# Patient Record
(demographics unavailable — no encounter records)

---

## 2025-04-14 NOTE — REVIEW OF SYSTEMS
[FreeTextEntry4] : R sided hearing loss [de-identified] : segment of hyperpigmentation on abdomen  [Hearing Loss] : no hearing loss [Joint Pain] : joint pain [Negative] : Integumentary

## 2025-04-14 NOTE — PLAN
[FreeTextEntry1] : Podiatry right heel pain- referred to podiatry to further assess for osteophytes, heel spur-referred to Dr. Montana Rodriguez Endocrinology vitamin D deficiency continue with vitamin D3 5000 IU qd, check vitamin D level Cardiology hyperlipidemia-check FLP, low chol diet advised Psychiatry patient asked for therapist contact info-referred to Dr. Lauren Russo  Patient is not fasting today  checking labs - CBC, CMP, TSH, Lipid panel, TSH and A1C (RX given and recommended to visit [facility] labs) check EKG (results as above)

## 2025-04-14 NOTE — END OF VISIT
[FreeTextEntry3] : I, Cristy Montoya, acted solely as scribe for Dr. Celestino Gonzalez DO on this date 04/14/2025.   All medical record entries made by the Scribe were at my, Dr. Celestino Gonzalez DO direction and personally dictated by me on 04/14/2025, I have reviewed the chart and agree that the record accurately reflects my personal performance of the history, physical exam, assessment and plan. I have also personally directed, reviewed and agreed with the chart.     [Time Spent: ___ minutes] : I have spent [unfilled] minutes of time on the encounter which excludes teaching and separately reported services.

## 2025-04-14 NOTE — HEALTH RISK ASSESSMENT
[Good] : ~his/her~  mood as  good [2 - 4 times a month (2 pts)] : 2-4 times a month (2 points) [5 or 6 (2 pts)] : 5 or 6 (2  points) [Monthly (2 pts)] : Monthly (2 points) [No] : In the past 12 months have you used drugs other than those required for medical reasons? No [Little interest or pleasure doing things] : 1) Little interest or pleasure doing things [Feeling down, depressed, or hopeless] : 2) Feeling down, depressed, or hopeless [0] : 2) Feeling down, depressed, or hopeless: Not at all (0) [PHQ-2 Negative - No further assessment needed] : PHQ-2 Negative - No further assessment needed [Audit-CScore] : 6 [[id number]] : 0

## 2025-04-14 NOTE — HISTORY OF PRESENT ILLNESS
[FreeTextEntry1] : annual wellness visit  [de-identified] : Patient is a 32 y/o m with PMHx as below presents today for annual wellness visit. The patient is not currently taking any prescription medications, but he does take a multivitamin and Vit D3 5000 IU daily. He has a hx of low Vit D.  Patient complains of intermittent pain of the right heel x 5 months.  He describes the pain as sharp and present upon getting out of bed in the morning. He says it has exacerbated in the winter months, after playing basketball.  He does not take any medications to improve it.   Vaccinations The patient received 3 doses of the COVID vaccine.  He did not receive the 4001-0007 Flu vaccine. He received the TDAP 03/2024.  The patient goes to the gym about 4 days a week and lifts weights. He reports he tries to eat a balanced, healthy diet but does not follow a specific plan.  The patient has no active cardiac issues. He was dx w/ WPW about 15 years ago and is s/p ablation x2 at [facility]. He denies having any issues since.   Last blood report in April of 2024 reveals elevated LDL 149mg/dL, elevated cholesterol 222mg/dL  Patient is not fasting today.

## 2025-04-14 NOTE — ASSESSMENT
[Vaccines Reviewed] : Immunizations reviewed today. Please see immunization details in the vaccine log within the immunization flowsheet.  [FreeTextEntry1] : Patient is a 32 y/o m with a PMHx as stated above, presenting today for annual wellness visit.

## 2025-04-14 NOTE — REVIEW OF SYSTEMS
[FreeTextEntry4] : R sided hearing loss [de-identified] : segment of hyperpigmentation on abdomen  [Hearing Loss] : no hearing loss [Joint Pain] : joint pain [Negative] : Integumentary

## 2025-04-14 NOTE — PLAN
[FreeTextEntry1] : Podiatry right heel pain- referred to podiatry to further assess for osteophytes, heel spur-referred to Dr. Montana Rodriguez Endocrinology vitamin D deficiency continue with vitamin D3 5000 IU qd, check vitamin D level Cardiology hyperlipidemia-check FLP, low chol diet advised Psychiatry patient asked for therapist contact info-referred to Dr. Lauren Russo  Patient is not fasting today  checking labs - CBC, CMP, TSH, Lipid panel, TSH and A1C (RX given and recommended to visit [facility] labs) check EKG (results as above)

## 2025-04-14 NOTE — PHYSICAL EXAM
[No Acute Distress] : no acute distress [Well Nourished] : well nourished [Well Developed] : well developed [Well-Appearing] : well-appearing [Normal Voice/Communication] : normal voice/communication [Normal Sclera/Conjunctiva] : normal sclera/conjunctiva [PERRL] : pupils equal round and reactive to light [EOMI] : extraocular movements intact [Normal Outer Ear/Nose] : the outer ears and nose were normal in appearance [Normal Oropharynx] : the oropharynx was normal [Normal Nasal Mucosa] : the nasal mucosa was normal [No JVD] : no jugular venous distention [No Lymphadenopathy] : no lymphadenopathy [Supple] : supple [Thyroid Normal, No Nodules] : the thyroid was normal and there were no nodules present [No Respiratory Distress] : no respiratory distress  [No Accessory Muscle Use] : no accessory muscle use [Clear to Auscultation] : lungs were clear to auscultation bilaterally [Normal Rate] : normal rate  [Regular Rhythm] : with a regular rhythm [Normal S1, S2] : normal S1 and S2 [No Murmur] : no murmur heard [No Carotid Bruits] : no carotid bruits [No Abdominal Bruit] : a ~M bruit was not heard ~T in the abdomen [No Varicosities] : no varicosities [Pedal Pulses Present] : the pedal pulses are present [No Edema] : there was no peripheral edema [No Palpable Aorta] : no palpable aorta [Soft] : abdomen soft [Non Tender] : non-tender [Non-distended] : non-distended [No Masses] : no abdominal mass palpated [No HSM] : no HSM [Normal Bowel Sounds] : normal bowel sounds [Normal Supraclavicular Nodes] : no supraclavicular lymphadenopathy [Normal Posterior Cervical Nodes] : no posterior cervical lymphadenopathy [Normal Anterior Cervical Nodes] : no anterior cervical lymphadenopathy [No CVA Tenderness] : no CVA  tenderness [No Spinal Tenderness] : no spinal tenderness [No Joint Swelling] : no joint swelling [Grossly Normal Strength/Tone] : grossly normal strength/tone [No Rash] : no rash [Coordination Grossly Intact] : coordination grossly intact [No Focal Deficits] : no focal deficits [Normal Gait] : normal gait [Deep Tendon Reflexes (DTR)] : deep tendon reflexes were 2+ and symmetric [Speech Grossly Normal] : speech grossly normal [Memory Grossly Normal] : memory grossly normal [Normal Affect] : the affect was normal [Alert and Oriented x3] : oriented to person, place, and time [Normal Mood] : the mood was normal [Normal Insight/Judgement] : insight and judgment were intact [de-identified] : right cerumen impaction [de-identified] : area of hyperpigmentation in the midline abdomen 2 by 2 in under the ribs [Normal TMs] : both tympanic membranes were normal [Kyphosis] : no kyphosis [Scoliosis] : no scoliosis [Acne] : no acne

## 2025-04-14 NOTE — HEALTH RISK ASSESSMENT
[Good] : ~his/her~  mood as  good [2 - 4 times a month (2 pts)] : 2-4 times a month (2 points) [5 or 6 (2 pts)] : 5 or 6 (2  points) [Monthly (2 pts)] : Monthly (2 points) [No] : In the past 12 months have you used drugs other than those required for medical reasons? No [Little interest or pleasure doing things] : 1) Little interest or pleasure doing things [Feeling down, depressed, or hopeless] : 2) Feeling down, depressed, or hopeless [0] : 2) Feeling down, depressed, or hopeless: Not at all (0) [PHQ-2 Negative - No further assessment needed] : PHQ-2 Negative - No further assessment needed [Audit-CScore] : 6 [[id number]] : 0

## 2025-04-14 NOTE — HISTORY OF PRESENT ILLNESS
[FreeTextEntry1] : annual wellness visit  [de-identified] : Patient is a 32 y/o m with PMHx as below presents today for annual wellness visit. The patient is not currently taking any prescription medications, but he does take a multivitamin and Vit D3 5000 IU daily. He has a hx of low Vit D.  Patient complains of intermittent pain of the right heel x 5 months.  He describes the pain as sharp and present upon getting out of bed in the morning. He says it has exacerbated in the winter months, after playing basketball.  He does not take any medications to improve it.   Vaccinations The patient received 3 doses of the COVID vaccine.  He did not receive the [id number] Flu vaccine. He received the TDAP 03/2024.  The patient goes to the gym about 4 days a week and lifts weights. He reports he tries to eat a balanced, healthy diet but does not follow a specific plan.  The patient has no active cardiac issues. He was dx w/ WPW about 15 years ago and is s/p ablation x2 at [facility]. He denies having any issues since.   Last blood report in April of 2024 reveals elevated LDL 149mg/dL, elevated cholesterol 222mg/dL  Patient is not fasting today.

## 2025-04-14 NOTE — PHYSICAL EXAM
[No Acute Distress] : no acute distress [Well Nourished] : well nourished [Well Developed] : well developed [Well-Appearing] : well-appearing [Normal Voice/Communication] : normal voice/communication [Normal Sclera/Conjunctiva] : normal sclera/conjunctiva [PERRL] : pupils equal round and reactive to light [EOMI] : extraocular movements intact [Normal Outer Ear/Nose] : the outer ears and nose were normal in appearance [Normal Oropharynx] : the oropharynx was normal [Normal Nasal Mucosa] : the nasal mucosa was normal [No JVD] : no jugular venous distention [No Lymphadenopathy] : no lymphadenopathy [Supple] : supple [Thyroid Normal, No Nodules] : the thyroid was normal and there were no nodules present [No Respiratory Distress] : no respiratory distress  [No Accessory Muscle Use] : no accessory muscle use [Clear to Auscultation] : lungs were clear to auscultation bilaterally [Normal Rate] : normal rate  [Regular Rhythm] : with a regular rhythm [Normal S1, S2] : normal S1 and S2 [No Murmur] : no murmur heard [No Carotid Bruits] : no carotid bruits [No Abdominal Bruit] : a ~M bruit was not heard ~T in the abdomen [No Varicosities] : no varicosities [Pedal Pulses Present] : the pedal pulses are present [No Edema] : there was no peripheral edema [No Palpable Aorta] : no palpable aorta [Soft] : abdomen soft [Non Tender] : non-tender [Non-distended] : non-distended [No Masses] : no abdominal mass palpated [No HSM] : no HSM [Normal Bowel Sounds] : normal bowel sounds [Normal Supraclavicular Nodes] : no supraclavicular lymphadenopathy [Normal Posterior Cervical Nodes] : no posterior cervical lymphadenopathy [Normal Anterior Cervical Nodes] : no anterior cervical lymphadenopathy [No CVA Tenderness] : no CVA  tenderness [No Spinal Tenderness] : no spinal tenderness [No Joint Swelling] : no joint swelling [Grossly Normal Strength/Tone] : grossly normal strength/tone [No Rash] : no rash [Coordination Grossly Intact] : coordination grossly intact [No Focal Deficits] : no focal deficits [Normal Gait] : normal gait [Deep Tendon Reflexes (DTR)] : deep tendon reflexes were 2+ and symmetric [Speech Grossly Normal] : speech grossly normal [Memory Grossly Normal] : memory grossly normal [Normal Affect] : the affect was normal [Alert and Oriented x3] : oriented to person, place, and time [Normal Mood] : the mood was normal [Normal Insight/Judgement] : insight and judgment were intact [de-identified] : right cerumen impaction [de-identified] : area of hyperpigmentation in the midline abdomen 2 by 2 in under the ribs [Normal TMs] : both tympanic membranes were normal [Kyphosis] : no kyphosis [Scoliosis] : no scoliosis [Acne] : no acne

## 2025-07-28 NOTE — END OF VISIT
[FreeTextEntry3] : I, Cristy Montoya, acted solely as scribe for Dr. Celestino Gonzalez DO on this date 07/28/2025.   All medical record entries made by the Scribe were at my, Dr. Celestino Gonzalez DO direction and personally dictated by me on 07/28/2025, I have reviewed the chart and agree that the record accurately reflects my personal performance of the history, physical exam, assessment and plan. I have also personally directed, reviewed and agreed with the chart.      [Time Spent: ___ minutes] : I have spent [unfilled] minutes of time on the encounter which excludes teaching and separately reported services.

## 2025-07-28 NOTE — END OF VISIT
[FreeTextEntry3] : I, Critsy Montoya, acted solely as scribe for Dr. Celestino Gonzalez DO on this date 07/28/2025.   All medical record entries made by the Scribe were at my, Dr. Celestino Gonzalez DO direction and personally dictated by me on 07/28/2025, I have reviewed the chart and agree that the record accurately reflects my personal performance of the history, physical exam, assessment and plan. I have also personally directed, reviewed and agreed with the chart.      [Time Spent: ___ minutes] : I have spent [unfilled] minutes of time on the encounter which excludes teaching and separately reported services.

## 2025-07-28 NOTE — PLAN
[FreeTextEntry1] : Endocrinology vitamin D deficiency continue with vitamin D3 5000 IU qd Cardiology hyperlipidemia-check FLP; continue Rosuvastatin Calcium 5mg p.o.q.d as directed; continue with low-fat/cholesterol diet and maintain CV exercise. Discussed the goal of LDL <100 mg/dl Ophthalmology -referred to Dr. Alejandro Delacruz or Dr. Clint Yi for consultation for Lasik surgery Urology -referred to Dr. Baer for consultation of vasectomy  follow up in 6 months

## 2025-07-28 NOTE — HISTORY OF PRESENT ILLNESS
[FreeTextEntry1] : fasting blood work, follow up hyperlipidemia [de-identified] : DEANGELO AUSTIN is a 32 year old M with past medical history as below who presents today to the office for fasting blood work and general follow up. Patient last blood work in April of 2025 demonstrated elevated LDL of 161 and Total Cholesterol at 233. Patient was started on Rosuvastatin 5mg and he has initiated taking the medication. Patient denies any myalgia and arthralgia. Pt notes when initially began taking the medication he would forget to take it regularly, had brain fog but now he is taking the medication regularly and this symptom has resolved. Pt notes he has been eating a balanced diet and has an exercise routine. Patient weight increased 7 pounds since the last visit in April of 2025.   Patient is fasting today.

## 2025-07-28 NOTE — HISTORY OF PRESENT ILLNESS
[FreeTextEntry1] : fasting blood work, follow up hyperlipidemia [de-identified] : DEANGELO AUSTIN is a 32 year old M with past medical history as below who presents today to the office for fasting blood work and general follow up. Patient last blood work in April of 2025 demonstrated elevated LDL of 161 and Total Cholesterol at 233. Patient was started on Rosuvastatin 5mg and he has initiated taking the medication. Patient denies any myalgia and arthralgia. Pt notes when initially began taking the medication he would forget to take it regularly, had brain fog but now he is taking the medication regularly and this symptom has resolved. Pt notes he has been eating a balanced diet and has an exercise routine. Patient weight increased 7 pounds since the last visit in April of 2025.   Patient is fasting today.

## 2025-07-28 NOTE — ASSESSMENT
[FreeTextEntry1] : DEANGELO AUSTIN is a 32 year old M with a past medical history as above who presents to the office today for fasting blood work to assess FLP/LFTs since starting Rosuvastatin and general follow up.

## 2025-07-28 NOTE — PHYSICAL EXAM
[No Acute Distress] : no acute distress [Well Nourished] : well nourished [Well Developed] : well developed [Well-Appearing] : well-appearing [Normal Sclera/Conjunctiva] : normal sclera/conjunctiva [PERRL] : pupils equal round and reactive to light [EOMI] : extraocular movements intact [Normal Outer Ear/Nose] : the outer ears and nose were normal in appearance [Normal Oropharynx] : the oropharynx was normal [No JVD] : no jugular venous distention [No Lymphadenopathy] : no lymphadenopathy [Supple] : supple [Thyroid Normal, No Nodules] : the thyroid was normal and there were no nodules present [No Respiratory Distress] : no respiratory distress  [No Accessory Muscle Use] : no accessory muscle use [Clear to Auscultation] : lungs were clear to auscultation bilaterally [Normal Rate] : normal rate  [Regular Rhythm] : with a regular rhythm [Normal S1, S2] : normal S1 and S2 [No Murmur] : no murmur heard [No Carotid Bruits] : no carotid bruits [No Abdominal Bruit] : a ~M bruit was not heard ~T in the abdomen [No Varicosities] : no varicosities [Pedal Pulses Present] : the pedal pulses are present [No Edema] : there was no peripheral edema [No Palpable Aorta] : no palpable aorta [No Extremity Clubbing/Cyanosis] : no extremity clubbing/cyanosis [Soft] : abdomen soft [Non Tender] : non-tender [Non-distended] : non-distended [No Masses] : no abdominal mass palpated [No HSM] : no HSM [Normal Bowel Sounds] : normal bowel sounds [Normal Posterior Cervical Nodes] : no posterior cervical lymphadenopathy [Normal Anterior Cervical Nodes] : no anterior cervical lymphadenopathy [No CVA Tenderness] : no CVA  tenderness [No Spinal Tenderness] : no spinal tenderness [No Joint Swelling] : no joint swelling [Grossly Normal Strength/Tone] : grossly normal strength/tone [No Rash] : no rash [Coordination Grossly Intact] : coordination grossly intact [No Focal Deficits] : no focal deficits [Normal Gait] : normal gait [Deep Tendon Reflexes (DTR)] : deep tendon reflexes were 2+ and symmetric [Normal Affect] : the affect was normal [Normal Insight/Judgement] : insight and judgment were intact [Normal Voice/Communication] : normal voice/communication [Normal Nasal Mucosa] : the nasal mucosa was normal [Normal Supraclavicular Nodes] : no supraclavicular lymphadenopathy [Kyphosis] : no kyphosis [Scoliosis] : no scoliosis [Acne] : no acne [Speech Grossly Normal] : speech grossly normal [Memory Grossly Normal] : memory grossly normal [Alert and Oriented x3] : oriented to person, place, and time [Normal Mood] : the mood was normal